# Patient Record
Sex: MALE | Race: WHITE | URBAN - METROPOLITAN AREA
[De-identification: names, ages, dates, MRNs, and addresses within clinical notes are randomized per-mention and may not be internally consistent; named-entity substitution may affect disease eponyms.]

---

## 2019-08-02 ENCOUNTER — APPOINTMENT (OUTPATIENT)
Dept: URBAN - METROPOLITAN AREA SURGERY 9 | Age: 59
Setting detail: DERMATOLOGY
End: 2019-08-05

## 2019-08-02 DIAGNOSIS — L43.8 OTHER LICHEN PLANUS: ICD-10-CM

## 2019-08-02 PROBLEM — I10 ESSENTIAL (PRIMARY) HYPERTENSION: Status: ACTIVE | Noted: 2019-08-02

## 2019-08-02 PROBLEM — G40.909 EPILEPSY, UNSPECIFIED, NOT INTRACTABLE, WITHOUT STATUS EPILEPTICUS: Status: ACTIVE | Noted: 2019-08-02

## 2019-08-02 PROBLEM — F32.9 MAJOR DEPRESSIVE DISORDER, SINGLE EPISODE, UNSPECIFIED: Status: ACTIVE | Noted: 2019-08-02

## 2019-08-02 PROBLEM — E78.5 HYPERLIPIDEMIA, UNSPECIFIED: Status: ACTIVE | Noted: 2019-08-02

## 2019-08-02 PROCEDURE — 99202 OFFICE O/P NEW SF 15 MIN: CPT

## 2019-08-02 PROCEDURE — OTHER PRESCRIPTION: OTHER

## 2019-08-02 PROCEDURE — OTHER COUNSELING: OTHER

## 2019-08-02 PROCEDURE — OTHER OTHER: OTHER

## 2019-08-02 RX ORDER — TACROLIMUS 1 MG/G
OINTMENT TOPICAL
Qty: 1 | Refills: 2 | Status: ERX | COMMUNITY
Start: 2019-08-02

## 2019-08-02 ASSESSMENT — LOCATION DETAILED DESCRIPTION DERM
LOCATION DETAILED: RIGHT DORSAL SHAFT OF PENIS
LOCATION DETAILED: DORSAL GLANS

## 2019-08-02 ASSESSMENT — LOCATION ZONE DERM: LOCATION ZONE: PENIS

## 2019-08-02 ASSESSMENT — LOCATION SIMPLE DESCRIPTION DERM: LOCATION SIMPLE: PENIS

## 2019-08-02 NOTE — PROCEDURE: OTHER
Note Text (......Xxx Chief Complaint.): This diagnosis correlates with the
Detail Level: Zone
Other (Free Text): clinically, the ddx includes lichen sclerosis. Patient will try to obtain the biopsy report and bring it to the next visit. He states Dr. 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 \\nHe may have some subtle Kehinde’s striae on the buccal mucosa.\\nPatient will use the protopic for a longer time period
Other (Free Text): He has adhesion of the foreskin to the coronal margin on the right. He states it has always been like this. He believes the rest of the foreskin used to be attached like this, but with this skin condition it has detached

## 2019-09-09 ENCOUNTER — APPOINTMENT (OUTPATIENT)
Dept: URBAN - METROPOLITAN AREA SURGERY 9 | Age: 59
Setting detail: DERMATOLOGY
End: 2019-09-10

## 2019-09-09 DIAGNOSIS — L43.8 OTHER LICHEN PLANUS: ICD-10-CM

## 2019-09-09 PROCEDURE — OTHER PRESCRIPTION: OTHER

## 2019-09-09 PROCEDURE — OTHER TREATMENT REGIMEN: OTHER

## 2019-09-09 PROCEDURE — OTHER OTHER: OTHER

## 2019-09-09 PROCEDURE — 99212 OFFICE O/P EST SF 10 MIN: CPT

## 2019-09-09 RX ORDER — TACROLIMUS 1 MG/G
OINTMENT TOPICAL
Qty: 1 | Refills: 2 | Status: ERX

## 2019-09-09 ASSESSMENT — LOCATION SIMPLE DESCRIPTION DERM: LOCATION SIMPLE: PENIS

## 2019-09-09 ASSESSMENT — LOCATION ZONE DERM: LOCATION ZONE: PENIS

## 2019-09-09 ASSESSMENT — LOCATION DETAILED DESCRIPTION DERM
LOCATION DETAILED: DORSAL PENILE SHAFT
LOCATION DETAILED: DORSAL GLANS PENIS

## 2019-09-09 NOTE — PROCEDURE: TREATMENT REGIMEN
Continue Regimen: Tacrolimus 0.1% oint. He will see how often he requires it. \\nCan add desonide briefly for flares since it works faster for him.
Detail Level: Detailed

## 2019-09-09 NOTE — PROCEDURE: OTHER
Detail Level: Zone
Other (Free Text): clinically, the ddx includes lichen sclerosis. He states Dr. 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 \\nHe may have some subtle Kehinde’s striae on the buccal mucosa.\\n\\nPatient signed for the biopsy report but they would not give it to him to bring. They said they would fax it. We have not yet received it. We will call [facility] medical records. We will let him know once it is received.\\n\\nAddendum: I received pathology report which showed like a “lichenoid dermatitis” which “in appropriate clinical context could be compatible with lichen planus.” Rare eosinophils “raises the possibility of a lichenoid hypersensitivity reaction.“\\n-clinically favor LP
Note Text (......Xxx Chief Complaint.): This diagnosis correlates with the
Other (Free Text): He has adhesion of the foreskin to the coronal margin on the right. He states it has always been like this. He believes the rest of the foreskin used to be attached like this, but with this skin condition it has detached

## 2020-12-17 ENCOUNTER — RX ONLY (RX ONLY)
Age: 60
End: 2020-12-17

## 2020-12-17 RX ORDER — TACROLIMUS 1 MG/G
OINTMENT TOPICAL
Qty: 1 | Refills: 0 | Status: ERX